# Patient Record
Sex: MALE | Race: WHITE | URBAN - METROPOLITAN AREA
[De-identification: names, ages, dates, MRNs, and addresses within clinical notes are randomized per-mention and may not be internally consistent; named-entity substitution may affect disease eponyms.]

---

## 2021-02-17 ENCOUNTER — DOCTOR'S OFFICE (OUTPATIENT)
Dept: URBAN - METROPOLITAN AREA CLINIC 163 | Facility: CLINIC | Age: 81
Setting detail: OPHTHALMOLOGY
End: 2021-02-17
Payer: MEDICARE

## 2021-02-17 PROCEDURE — 99024 POSTOP FOLLOW-UP VISIT: CPT | Performed by: OPTOMETRIST

## 2021-03-02 ENCOUNTER — DOCTOR'S OFFICE (OUTPATIENT)
Dept: URBAN - METROPOLITAN AREA CLINIC 163 | Facility: CLINIC | Age: 81
Setting detail: OPHTHALMOLOGY
End: 2021-03-02
Payer: MEDICARE

## 2021-03-02 PROCEDURE — 99024 POSTOP FOLLOW-UP VISIT: CPT | Performed by: OPTOMETRIST

## 2021-03-10 ENCOUNTER — DOCTOR'S OFFICE (OUTPATIENT)
Dept: URBAN - METROPOLITAN AREA CLINIC 163 | Facility: CLINIC | Age: 81
Setting detail: OPHTHALMOLOGY
End: 2021-03-10
Payer: MEDICARE

## 2021-03-10 PROCEDURE — 67840 REMOVE EYELID LESION: CPT | Performed by: OPHTHALMOLOGY

## 2021-03-30 ENCOUNTER — DOCTOR'S OFFICE (OUTPATIENT)
Dept: URBAN - METROPOLITAN AREA CLINIC 163 | Facility: CLINIC | Age: 81
Setting detail: OPHTHALMOLOGY
End: 2021-03-30
Payer: MEDICARE

## 2021-03-30 PROCEDURE — 99024 POSTOP FOLLOW-UP VISIT: CPT | Performed by: OPTOMETRIST

## 2021-05-26 ENCOUNTER — RX ONLY (RX ONLY)
Age: 81
End: 2021-05-26

## 2021-05-27 ENCOUNTER — DOCTOR'S OFFICE (OUTPATIENT)
Dept: URBAN - METROPOLITAN AREA CLINIC 163 | Facility: CLINIC | Age: 81
Setting detail: OPHTHALMOLOGY
End: 2021-05-27
Payer: MEDICARE

## 2021-05-27 PROCEDURE — 92012 INTRM OPH EXAM EST PATIENT: CPT | Performed by: OPTOMETRIST

## 2021-05-27 ASSESSMENT — REFRACTION_MANIFEST
OS_CYLINDER: +1.75
OS_AXIS: 175
OD_ADD: +3.00
OD_SPHERE: +0.75
OD_VA1: 20/20
OD_AXIS: 165
OS_SPHERE: -0.75
OS_ADD: +3.00
OD_CYLINDER: +1.00
OS_VA1: 20/20

## 2021-05-27 ASSESSMENT — REFRACTION_CURRENTRX
OD_CYLINDER: +1.00
OS_CYLINDER: +1.25
OS_OVR_VA: 20/
OD_SPHERE: +0.75
OS_OVR_VA: 20/
OD_SPHERE: +0.75
OS_SPHERE: -0.75
OD_OVR_VA: 20/
OS_AXIS: 175
OD_AXIS: 170
OS_AXIS: 5
OS_ADD: +2.50
OD_ADD: +2.50
OD_OVR_VA: 20/
OD_CYLINDER: +1.00
OD_AXIS: 175
OS_CYLINDER: +1.25
OS_SPHERE: +0.25

## 2021-05-27 ASSESSMENT — VISUAL ACUITY
OS_BCVA: 20/20-2
OD_BCVA: 20/25

## 2021-05-27 ASSESSMENT — CONFRONTATIONAL VISUAL FIELD TEST (CVF)
OD_FINDINGS: FULL
OS_FINDINGS: FULL

## 2021-05-27 ASSESSMENT — SPHEQUIV_DERIVED
OD_SPHEQUIV: 1.25
OS_SPHEQUIV: 0.125

## 2021-09-23 ENCOUNTER — DOCTOR'S OFFICE (OUTPATIENT)
Dept: URBAN - METROPOLITAN AREA CLINIC 163 | Facility: CLINIC | Age: 81
Setting detail: OPHTHALMOLOGY
End: 2021-09-23
Payer: MEDICARE

## 2021-09-23 PROCEDURE — 92014 COMPRE OPH EXAM EST PT 1/>: CPT | Performed by: OPTOMETRIST

## 2021-09-23 PROCEDURE — 92250 FUNDUS PHOTOGRAPHY W/I&R: CPT | Performed by: OPTOMETRIST

## 2021-09-23 ASSESSMENT — REFRACTION_CURRENTRX
OS_OVR_VA: 20/
OS_OVR_VA: 20/
OD_OVR_VA: 20/
OD_OVR_VA: 20/

## 2021-09-23 ASSESSMENT — VISUAL ACUITY
OD_BCVA: 20/25
OS_BCVA: 20/20

## 2021-09-23 ASSESSMENT — CONFRONTATIONAL VISUAL FIELD TEST (CVF)
OD_FINDINGS: FULL
OS_FINDINGS: FULL

## 2021-09-23 ASSESSMENT — TONOMETRY
OD_IOP_MMHG: 13
OS_IOP_MMHG: 13

## 2022-03-28 ENCOUNTER — DOCTOR'S OFFICE (OUTPATIENT)
Dept: URBAN - METROPOLITAN AREA CLINIC 163 | Facility: CLINIC | Age: 82
Setting detail: OPHTHALMOLOGY
End: 2022-03-28
Payer: MEDICARE

## 2022-03-28 PROCEDURE — 92083 EXTENDED VISUAL FIELD XM: CPT | Performed by: OPTOMETRIST

## 2022-03-28 PROCEDURE — 92133 CPTRZD OPH DX IMG PST SGM ON: CPT | Performed by: OPTOMETRIST

## 2022-03-28 PROCEDURE — 92012 INTRM OPH EXAM EST PATIENT: CPT | Performed by: OPTOMETRIST

## 2022-03-28 ASSESSMENT — VISUAL ACUITY
OS_BCVA: 20/20-2
OD_BCVA: 20/20

## 2022-03-28 ASSESSMENT — TONOMETRY
OS_IOP_MMHG: 10
OD_IOP_MMHG: 13

## 2022-07-18 ENCOUNTER — DOCTOR'S OFFICE (OUTPATIENT)
Dept: URBAN - METROPOLITAN AREA CLINIC 163 | Facility: CLINIC | Age: 82
Setting detail: OPHTHALMOLOGY
End: 2022-07-18
Payer: MEDICARE

## 2022-07-18 PROBLEM — H04.123: Status: ACTIVE | Noted: 2022-03-28

## 2022-07-18 PROBLEM — D23.121 OTHER BENIGN NEOPLASM OF SKIN OF LEFT UPPER EYELID, INCLUDING CANTHUS: Status: ACTIVE | Noted: 2021-09-23

## 2022-07-18 PROBLEM — H40.1122 POAG; RIGHT EYE MILD, LEFT EYE MODERATE: Status: ACTIVE | Noted: 2022-03-28

## 2022-07-18 PROBLEM — H25.11 CATARACT; RIGHT EYE: Status: ACTIVE | Noted: 2021-05-27

## 2022-07-18 PROBLEM — H40.1111 POAG; RIGHT EYE MILD, LEFT EYE MODERATE: Status: ACTIVE | Noted: 2022-03-28

## 2022-07-18 PROCEDURE — 92145 CORNEAL HYSTERESIS DETER: CPT | Performed by: OPTOMETRIST

## 2022-07-18 PROCEDURE — 92014 COMPRE OPH EXAM EST PT 1/>: CPT | Performed by: OPTOMETRIST

## 2022-07-18 PROCEDURE — 92250 FUNDUS PHOTOGRAPHY W/I&R: CPT | Performed by: OPTOMETRIST

## 2022-07-18 ASSESSMENT — PACHYMETRY
OS_CT_CORRECTION: 0
OD_CT_UM: 549
OS_CT_UM: 546
OD_CT_CORRECTION: 0

## 2022-07-18 ASSESSMENT — CONFRONTATIONAL VISUAL FIELD TEST (CVF)
OS_FINDINGS: FULL
OD_FINDINGS: FULL

## 2022-07-18 ASSESSMENT — VISUAL ACUITY
OD_BCVA: 20/20-
OS_BCVA: 20/20

## 2023-01-16 ENCOUNTER — DOCTOR'S OFFICE (OUTPATIENT)
Dept: URBAN - METROPOLITAN AREA CLINIC 163 | Facility: CLINIC | Age: 83
Setting detail: OPHTHALMOLOGY
End: 2023-01-16
Payer: MEDICARE

## 2023-01-16 DIAGNOSIS — H40.1122: ICD-10-CM

## 2023-01-16 DIAGNOSIS — H25.11: ICD-10-CM

## 2023-01-16 DIAGNOSIS — H40.1111: ICD-10-CM

## 2023-01-16 DIAGNOSIS — D23.121: ICD-10-CM

## 2023-01-16 DIAGNOSIS — H04.123: ICD-10-CM

## 2023-01-16 PROCEDURE — 92133 CPTRZD OPH DX IMG PST SGM ON: CPT | Performed by: OPTOMETRIST

## 2023-01-16 PROCEDURE — 92012 INTRM OPH EXAM EST PATIENT: CPT | Performed by: OPTOMETRIST

## 2023-01-16 PROCEDURE — 92083 EXTENDED VISUAL FIELD XM: CPT | Performed by: OPTOMETRIST

## 2023-01-16 ASSESSMENT — VISUAL ACUITY
OD_BCVA: 20/30
OS_BCVA: 20/20-1

## 2023-01-16 ASSESSMENT — PACHYMETRY
OD_CT_UM: 549
OD_CT_CORRECTION: 0
OS_CT_CORRECTION: 0
OS_CT_UM: 546

## 2023-01-16 ASSESSMENT — CONFRONTATIONAL VISUAL FIELD TEST (CVF)
OS_FINDINGS: FULL
OD_FINDINGS: FULL

## 2023-07-17 ENCOUNTER — DOCTOR'S OFFICE (OUTPATIENT)
Dept: URBAN - METROPOLITAN AREA CLINIC 163 | Facility: CLINIC | Age: 83
Setting detail: OPHTHALMOLOGY
End: 2023-07-17
Payer: MEDICARE

## 2023-07-17 DIAGNOSIS — H40.1122: ICD-10-CM

## 2023-07-17 DIAGNOSIS — H40.1111: ICD-10-CM

## 2023-07-17 DIAGNOSIS — H04.123: ICD-10-CM

## 2023-07-17 DIAGNOSIS — H25.11: ICD-10-CM

## 2023-07-17 DIAGNOSIS — H47.233: ICD-10-CM

## 2023-07-17 DIAGNOSIS — D23.121: ICD-10-CM

## 2023-07-17 PROCEDURE — 92250 FUNDUS PHOTOGRAPHY W/I&R: CPT | Performed by: OPTOMETRIST

## 2023-07-17 PROCEDURE — 0509T PATTERN ERG W/I&R: CPT | Performed by: OPTOMETRIST

## 2023-07-17 PROCEDURE — 92014 COMPRE OPH EXAM EST PT 1/>: CPT | Performed by: OPTOMETRIST

## 2023-07-17 ASSESSMENT — PACHYMETRY
OD_CT_UM: 549
OS_CT_CORRECTION: 0
OS_CT_UM: 546
OD_CT_CORRECTION: 0

## 2023-07-17 ASSESSMENT — CONFRONTATIONAL VISUAL FIELD TEST (CVF)
OS_FINDINGS: FULL
OD_FINDINGS: FULL

## 2023-07-17 ASSESSMENT — VISUAL ACUITY
OS_BCVA: 20/20-1
OD_BCVA: 20/20

## 2023-09-02 PROBLEM — Z97.3 WEARS EYEGLASSES: Status: ACTIVE | Noted: 2023-09-02

## 2023-09-02 PROBLEM — I63.9 CEREBROVASCULAR ACCIDENT (MULTI): Status: ACTIVE | Noted: 2023-09-02

## 2023-09-02 PROBLEM — E11.311 DIABETIC MACULAR EDEMA (MULTI): Status: ACTIVE | Noted: 2023-09-02

## 2023-09-02 PROBLEM — H52.203 ASTIGMATISM OF BOTH EYES: Status: ACTIVE | Noted: 2023-09-02

## 2023-09-02 PROBLEM — E78.00 HYPERCHOLESTEROLEMIA: Status: ACTIVE | Noted: 2023-09-02

## 2023-09-02 PROBLEM — R07.9 CHEST PAIN: Status: ACTIVE | Noted: 2023-09-02

## 2023-09-02 PROBLEM — I25.10 ASHD (ARTERIOSCLEROTIC HEART DISEASE): Status: ACTIVE | Noted: 2023-09-02

## 2023-09-02 PROBLEM — H25.813 COMBINED FORMS OF AGE-RELATED CATARACT OF BOTH EYES: Status: ACTIVE | Noted: 2023-09-02

## 2023-09-02 PROBLEM — E11.65 HYPERGLYCEMIA DUE TO TYPE 2 DIABETES MELLITUS (MULTI): Status: ACTIVE | Noted: 2023-09-02

## 2023-09-02 PROBLEM — I10 ESSENTIAL (PRIMARY) HYPERTENSION: Status: ACTIVE | Noted: 2023-09-02

## 2023-09-02 PROBLEM — M54.9 BACKACHE: Status: ACTIVE | Noted: 2023-09-02

## 2023-09-02 PROBLEM — Z98.42 CATARACT EXTRACTION STATUS OF LEFT EYE: Status: ACTIVE | Noted: 2023-09-02

## 2023-09-02 PROBLEM — M16.9 OSTEOARTHRITIS OF HIP: Status: ACTIVE | Noted: 2023-09-02

## 2023-09-02 PROBLEM — E11.3393: Status: ACTIVE | Noted: 2023-09-02

## 2023-09-02 PROBLEM — Z86.39 HX OF INSULIN DEPENDENT DIABETES MELLITUS: Status: ACTIVE | Noted: 2023-09-02

## 2023-09-02 PROBLEM — E11.9 TYPE 2 DIABETES MELLITUS WITHOUT COMPLICATIONS (MULTI): Status: ACTIVE | Noted: 2023-09-02

## 2023-09-02 PROBLEM — H01.009 BLEPHARITIS: Status: ACTIVE | Noted: 2023-09-02

## 2023-09-02 PROBLEM — Z96.1 PSEUDOPHAKIA, BOTH EYES: Status: ACTIVE | Noted: 2023-09-02

## 2023-09-02 PROBLEM — I48.91 ATRIAL FIBRILLATION (MULTI): Status: ACTIVE | Noted: 2023-09-02

## 2023-09-02 PROBLEM — M48.02 SPINAL STENOSIS IN CERVICAL REGION: Status: ACTIVE | Noted: 2023-09-02

## 2023-09-02 PROBLEM — E10.3393: Status: ACTIVE | Noted: 2023-09-02

## 2023-09-02 PROBLEM — M19.019 PRIMARY LOCALIZED OSTEOARTHROSIS OF SHOULDER REGION: Status: ACTIVE | Noted: 2023-09-02

## 2023-09-02 RX ORDER — FINASTERIDE 5 MG/1
5 TABLET, FILM COATED ORAL DAILY
COMMUNITY

## 2023-09-02 RX ORDER — BLOOD SUGAR DIAGNOSTIC
STRIP MISCELLANEOUS
COMMUNITY
Start: 2018-04-04

## 2023-09-02 RX ORDER — SIMVASTATIN 10 MG/1
TABLET, FILM COATED ORAL
COMMUNITY
Start: 2018-03-21 | End: 2023-12-11 | Stop reason: WASHOUT

## 2023-09-02 RX ORDER — INSULIN GLARGINE 100 [IU]/ML
35 INJECTION, SOLUTION SUBCUTANEOUS EVERY MORNING
COMMUNITY
Start: 2022-01-19 | End: 2023-11-22

## 2023-09-02 RX ORDER — GABAPENTIN 100 MG/1
100 CAPSULE ORAL 2 TIMES DAILY
COMMUNITY

## 2023-09-02 RX ORDER — POLYETHYLENE GLYCOL 3350 17 G/17G
POWDER, FOR SOLUTION ORAL
COMMUNITY

## 2023-09-02 RX ORDER — CARVEDILOL 25 MG/1
1 TABLET ORAL
COMMUNITY
Start: 2021-03-04 | End: 2024-06-20

## 2023-09-02 RX ORDER — ATORVASTATIN CALCIUM 10 MG/1
10 TABLET, FILM COATED ORAL DAILY
COMMUNITY

## 2023-09-02 RX ORDER — NAPROXEN SODIUM 220 MG/1
81 TABLET, FILM COATED ORAL DAILY
COMMUNITY

## 2023-09-02 RX ORDER — AMLODIPINE BESYLATE 5 MG/1
5 TABLET ORAL DAILY
COMMUNITY
Start: 2021-03-19

## 2023-09-02 RX ORDER — ISOSORBIDE MONONITRATE 30 MG/1
30 TABLET, EXTENDED RELEASE ORAL 2 TIMES DAILY
COMMUNITY
Start: 2023-06-05

## 2023-09-02 RX ORDER — UBIDECARENONE 75 MG
500 CAPSULE ORAL DAILY
COMMUNITY

## 2023-09-02 RX ORDER — DEXTROMETHORPHAN HYDROBROMIDE, GUAIFENESIN 5; 100 MG/5ML; MG/5ML
1300 LIQUID ORAL EVERY 4 HOURS PRN
COMMUNITY

## 2023-09-02 RX ORDER — DOFETILIDE 0.25 MG/1
250 CAPSULE ORAL 2 TIMES DAILY
COMMUNITY
Start: 2023-07-05

## 2023-09-02 RX ORDER — INSULIN ASPART 100 [IU]/ML
INJECTION, SOLUTION INTRAVENOUS; SUBCUTANEOUS
COMMUNITY
Start: 2018-06-11 | End: 2023-11-22

## 2023-09-02 RX ORDER — INSULIN LISPRO 100 [IU]/ML
INJECTION, SOLUTION INTRAVENOUS; SUBCUTANEOUS
COMMUNITY
End: 2023-12-11 | Stop reason: WASHOUT

## 2023-09-02 RX ORDER — DOCUSATE SODIUM 100 MG/1
100 CAPSULE, LIQUID FILLED ORAL 2 TIMES DAILY
COMMUNITY

## 2023-09-02 RX ORDER — PEN NEEDLE, DIABETIC 30 GX3/16"
NEEDLE, DISPOSABLE MISCELLANEOUS
COMMUNITY
Start: 2020-12-28 | End: 2023-12-18 | Stop reason: SDUPTHER

## 2023-09-02 RX ORDER — GLUCOSAMINE/D3/BOSWELLIA SERRA 1500MG-400
2 TABLET ORAL DAILY
COMMUNITY

## 2023-09-02 RX ORDER — INSULIN DETEMIR 100 [IU]/ML
35 INJECTION, SOLUTION SUBCUTANEOUS EVERY MORNING
COMMUNITY
Start: 2018-01-19 | End: 2023-12-11 | Stop reason: WASHOUT

## 2023-09-02 RX ORDER — WARFARIN 3 MG/1
TABLET ORAL
COMMUNITY
End: 2023-12-11 | Stop reason: WASHOUT

## 2023-09-02 RX ORDER — ASCORBIC ACID 500 MG
500 TABLET ORAL DAILY
COMMUNITY
End: 2023-12-11 | Stop reason: WASHOUT

## 2023-09-02 RX ORDER — CARVEDILOL 6.25 MG/1
1 TABLET ORAL
COMMUNITY
End: 2023-12-11 | Stop reason: WASHOUT

## 2023-09-02 RX ORDER — AMOXICILLIN 250 MG
1 CAPSULE ORAL 2 TIMES DAILY
COMMUNITY
Start: 2023-05-30

## 2023-09-02 RX ORDER — LOSARTAN POTASSIUM 50 MG/1
50 TABLET ORAL DAILY
COMMUNITY

## 2023-09-02 RX ORDER — CHOLECALCIFEROL (VITAMIN D3) 125 MCG
CAPSULE ORAL
COMMUNITY

## 2023-11-22 DIAGNOSIS — E11.9 TYPE 2 DIABETES MELLITUS WITHOUT COMPLICATION, WITH LONG-TERM CURRENT USE OF INSULIN (MULTI): Primary | ICD-10-CM

## 2023-11-22 DIAGNOSIS — Z79.4 TYPE 2 DIABETES MELLITUS WITHOUT COMPLICATION, WITH LONG-TERM CURRENT USE OF INSULIN (MULTI): Primary | ICD-10-CM

## 2023-11-22 RX ORDER — INSULIN GLARGINE 100 [IU]/ML
INJECTION, SOLUTION SUBCUTANEOUS
Qty: 90 ML | Refills: 3 | Status: SHIPPED | OUTPATIENT
Start: 2023-11-22

## 2023-11-22 RX ORDER — INSULIN ASPART 100 [IU]/ML
INJECTION, SOLUTION INTRAVENOUS; SUBCUTANEOUS
Qty: 45 ML | Refills: 3 | Status: SHIPPED | OUTPATIENT
Start: 2023-11-22

## 2023-12-07 NOTE — PROGRESS NOTES
HPI:  82 yo with Diabetes 2(dx mid 1990's,) HTN, Dyslipidemia, CVD (MI/cabg) irregular heart rhythm, CVA (L sided numbness), renal cancer, short term memory loss here for f/u. A1c was 6.9 %, today 7.4 %.  Pt is struggling following a carb controlled diet and knows reasonable carb allowances. Pt is able to afford their medications.         Personal start for margareth 2 CGM provided last visit, enjoying use, denies problems with sensor use/adhesion         30 day margareth data (date corrected) in range 64%, 0% lows, avg , pattern: mid 100's on waking, often  <100 , mid/100's at lunch, upper 100's-low 200's at dinner, 150-200 at bedtime.           Pt taking basaglar 35-0-0-40           taking novolog scale 100-120 9 units, 121-160 10units, 161-200 11 units, etc, forgets some doses         Pt not interested in carb counting.               Taking atorvastatin 10mg for lipids and tolerating.         Taking losartan 50mg, amlodipine 5mg, carvedilol 25mg bid, isordil 30mg, tikosyn for htn/heart.          -watchman placement planned         Recent labs with [facility], not available for review    Reviewed dosing novolog when dining out, insulin storage, reminders for novolog dosing, treatment of hypoglycemia    /64 (BP Location: Right arm, Patient Position: Sitting)   Pulse 61   Wt 96 kg (211 lb 9.6 oz)   BMI 33.14 kg/m²     Current Outpatient Medications:     acetaminophen (Tylenol Arthritis Pain) 650 mg ER tablet, Take 2 tablets (1,300 mg) by mouth every 4 hours if needed., Disp: , Rfl:     amLODIPine (Norvasc) 5 mg tablet, Take 1 tablet (5 mg) by mouth once daily., Disp: , Rfl:     aspirin 81 mg chewable tablet, Chew 1 tablet (81 mg) once daily., Disp: , Rfl:     atorvastatin (Lipitor) 10 mg tablet, Take 1 tablet (10 mg) by mouth once daily., Disp: , Rfl:     carvedilol (Coreg) 25 mg tablet, Take 1 tablet (25 mg) by mouth 2 times a day with meals., Disp: , Rfl:     cholecalciferol (Vitamin D-3) 125 MCG  "(5000 UT) capsule, as directed Orally, Disp: , Rfl:     cyanocobalamin (Vitamin B-12) 500 mcg tablet, Take 1 tablet (500 mcg) by mouth once daily., Disp: , Rfl:     docusate sodium (Colace) 100 mg capsule, Take 1 capsule (100 mg) by mouth 2 times a day., Disp: , Rfl:     dofetilide (Tikosyn) 250 mcg capsule, Take 1 capsule (250 mcg) by mouth twice a day., Disp: , Rfl:     finasteride (Proscar) 5 mg tablet, Take 1 tablet (5 mg) by mouth once daily., Disp: , Rfl:     gabapentin (Neurontin) 100 mg capsule, Take 1 capsule (100 mg) by mouth 2 times a day., Disp: , Rfl:     glucosamine-D3-Boswellia serr (Glucosamine Daily Complex) 1,500-400-100 mg-unit-mg tablet, Take 2 tablets by mouth once daily., Disp: , Rfl:     insulin aspart (NovoLOG Flexpen U-100 Insulin) 100 unit/mL (3 mL) pen, INJECT WITH MEALS PER SLIDING SCALE A TOTAL OF 45 UNITS DAILY, Disp: 45 mL, Rfl: 3    insulin glargine (Basaglar KwikPen U-100 Insulin) 100 unit/mL (3 mL) pen, INJECT 35 UNITS UNDER THE SKIN IN THE MORNING AND 50 UNITS EVERY EVENING, Disp: 90 mL, Rfl: 3    isosorbide mononitrate ER (Imdur) 30 mg 24 hr tablet, Take 1 tablet (30 mg) by mouth twice a day., Disp: , Rfl:     losartan (Cozaar) 50 mg tablet, Take 1 tablet (50 mg) by mouth once daily., Disp: , Rfl:     OneTouch Ultra Test strip, OneTouch Ultra Blue STRP  Quantity: 300  Refills: 0      Start : 4-Apr-2018  Active, Disp: , Rfl:     pen needle, diabetic 32 gauge x 5/32\" needle, as directed sc 5 times per day for 90 days, Disp: , Rfl:     polyethylene glycol (Miralax) 17 gram/dose powder, MiraLax POWD  Refills: 0     Active, Disp: , Rfl:     sennosides-docusate sodium (Radha-Colace) 8.6-50 mg tablet, Take 1 tablet by mouth twice a day., Disp: , Rfl:   Labs:  Lab Results   Component Value Date    WBC 7.1 06/10/2022    NRBC 0 06/10/2022    RBC 4.38 (L) 06/10/2022    HGB 12.6 (L) 06/10/2022    HCT 38.8 (L) 06/10/2022     06/10/2022     Lab Results   Component Value Date    CALCIUM " 10.0 06/10/2022    AST 18 06/10/2022    ALKPHOS 63 06/10/2022    BILITOT 0.3 06/10/2022    PROT 6.7 06/10/2022    ALBUMIN 4.1 06/10/2022    GLOB 2.6 06/10/2022    AGR 1.6 06/10/2022     06/10/2022    K 4.4 06/10/2022     06/10/2022    CO2 26 06/10/2022    ANIONGAP 9 06/10/2022    BUN 18 06/10/2022    CREATININE 0.9 06/10/2022    UREACREAUR 20.0 06/10/2022    GLUCOSE 71 06/10/2022    ALT 18 06/10/2022    EGFR 84 06/10/2022     Lab Results   Component Value Date    CHOL 109 (L) 06/10/2022    TRIG 146 06/10/2022    HDL 30 (L) 06/10/2022    LDLCALC 50 (L) 06/10/2022     Lab Results   Component Value Date    MICROALBCREA 31.0 (H) 06/10/2022     Lab Results   Component Value Date    TSH 2.06 07/11/2019     Lab Results   Component Value Date    [id number] 930 07/05/2022     Lab Results   Component Value Date    HGBA1C 7.4 (A) 12/11/2023       Assessment and Plan:    1. Type 2 diabetes mellitus with hyperglycemia, with long-term current use of insulin (CMS/[facility])    -dropping overnight on current lantus dose, decrease pm to 35 units    -postprandial hyperglycemia dinner, add 4 units to dinner scale    2. Essential (primary) hypertension    -at target on current therapy    3. Hypercholesterolemia    -on statin and tolerating        -labs/tests/notes reviewed  -reviewed and counseled patient on medication monitoring and side effects    Treatment and plan discussed with Dr. Jiménez. Leatha RN Certified [facility] and

## 2023-12-11 ENCOUNTER — CLINICAL SUPPORT (OUTPATIENT)
Dept: ENDOCRINOLOGY | Facility: CLINIC | Age: 83
End: 2023-12-11
Payer: MEDICARE

## 2023-12-11 VITALS
WEIGHT: 211.6 LBS | BODY MASS INDEX: 33.14 KG/M2 | DIASTOLIC BLOOD PRESSURE: 64 MMHG | HEART RATE: 61 BPM | SYSTOLIC BLOOD PRESSURE: 140 MMHG

## 2023-12-11 DIAGNOSIS — I10 ESSENTIAL (PRIMARY) HYPERTENSION: ICD-10-CM

## 2023-12-11 DIAGNOSIS — E11.65 TYPE 2 DIABETES MELLITUS WITH HYPERGLYCEMIA, WITH LONG-TERM CURRENT USE OF INSULIN (MULTI): Primary | ICD-10-CM

## 2023-12-11 DIAGNOSIS — Z79.4 TYPE 2 DIABETES MELLITUS WITH HYPERGLYCEMIA, WITH LONG-TERM CURRENT USE OF INSULIN (MULTI): Primary | ICD-10-CM

## 2023-12-11 DIAGNOSIS — E78.00 HYPERCHOLESTEROLEMIA: ICD-10-CM

## 2023-12-11 LAB — POC HEMOGLOBIN A1C: 7.4 % (ref 4.2–6.5)

## 2023-12-11 PROCEDURE — 95251 CONT GLUC MNTR ANALYSIS I&R: CPT | Performed by: INTERNAL MEDICINE

## 2023-12-11 PROCEDURE — 99214 OFFICE O/P EST MOD 30 MIN: CPT | Performed by: INTERNAL MEDICINE

## 2023-12-11 PROCEDURE — 83036 HEMOGLOBIN GLYCOSYLATED A1C: CPT | Performed by: INTERNAL MEDICINE

## 2023-12-11 PROCEDURE — 95249 CONT GLUC MNTR PT PROV EQP: CPT | Performed by: INTERNAL MEDICINE

## 2023-12-11 ASSESSMENT — PAIN SCALES - GENERAL: PAINLEVEL: 0-NO PAIN

## 2023-12-11 NOTE — PATIENT INSTRUCTIONS
Decrease evening Lantus to 35 units    Add 4 units novolog to your dinner   Base dose is now 14 units (not 10)

## 2023-12-11 NOTE — PROGRESS NOTES
I, Dr Nicolas Jiménez, have reviewed this progress note, medication list, vital signs, any pertinent lab values, and any CGM data if present with the Certified Diabetes [facility] and  face to face during this visit today. This note reflects the treatment plan that was made under my direction after reviewing the above mentioned elements while face to face with the patient and CDE.  I personally answered and addressed any questions and concerns the patient had during the visit today.  The CDE entered the data in this note under my direction and I personally reviewed it, signed any lab or medication orders that I instructed to be completed. I am the billing provider for this visit and the level of service was determined by my involvement in the Medical Decision Making Component of this visit while face to face with the patient.    Nicolas Jiménez MD

## 2023-12-18 DIAGNOSIS — E11.9 TYPE 2 DIABETES MELLITUS WITHOUT COMPLICATION, UNSPECIFIED WHETHER LONG TERM INSULIN USE (MULTI): Primary | ICD-10-CM

## 2023-12-18 RX ORDER — PEN NEEDLE, DIABETIC 30 GX3/16"
1 NEEDLE, DISPOSABLE MISCELLANEOUS
Qty: 500 EACH | Refills: 1 | Status: SHIPPED | OUTPATIENT
Start: 2023-12-18 | End: 2023-12-19 | Stop reason: SDUPTHER

## 2023-12-19 DIAGNOSIS — E11.9 TYPE 2 DIABETES MELLITUS WITHOUT COMPLICATION, UNSPECIFIED WHETHER LONG TERM INSULIN USE (MULTI): ICD-10-CM

## 2023-12-19 RX ORDER — PEN NEEDLE, DIABETIC 30 GX3/16"
1 NEEDLE, DISPOSABLE MISCELLANEOUS
Qty: 500 EACH | Refills: 1 | Status: SHIPPED | OUTPATIENT
Start: 2023-12-19 | End: 2024-03-18

## 2024-06-11 ENCOUNTER — APPOINTMENT (OUTPATIENT)
Dept: ENDOCRINOLOGY | Facility: CLINIC | Age: 84
End: 2024-06-11
Payer: MEDICARE

## 2024-06-25 ENCOUNTER — OFFICE VISIT (OUTPATIENT)
Dept: ENDOCRINOLOGY | Facility: CLINIC | Age: 84
End: 2024-06-25
Payer: MEDICARE

## 2024-06-25 VITALS
DIASTOLIC BLOOD PRESSURE: 58 MMHG | HEART RATE: 59 BPM | BODY MASS INDEX: 30.38 KG/M2 | SYSTOLIC BLOOD PRESSURE: 136 MMHG | WEIGHT: 194 LBS

## 2024-06-25 DIAGNOSIS — E11.9 DIABETES MELLITUS WITHOUT COMPLICATION (MULTI): ICD-10-CM

## 2024-06-25 DIAGNOSIS — E78.00 HYPERCHOLESTEROLEMIA: ICD-10-CM

## 2024-06-25 DIAGNOSIS — I10 ESSENTIAL (PRIMARY) HYPERTENSION: Primary | ICD-10-CM

## 2024-06-25 LAB — POC HEMOGLOBIN A1C: 7.1 % (ref 4.2–6.5)

## 2024-06-25 PROCEDURE — 83036 HEMOGLOBIN GLYCOSYLATED A1C: CPT | Performed by: INTERNAL MEDICINE

## 2024-06-25 PROCEDURE — 3078F DIAST BP <80 MM HG: CPT | Performed by: INTERNAL MEDICINE

## 2024-06-25 PROCEDURE — 1159F MED LIST DOCD IN RCRD: CPT | Performed by: INTERNAL MEDICINE

## 2024-06-25 PROCEDURE — 3075F SYST BP GE 130 - 139MM HG: CPT | Performed by: INTERNAL MEDICINE

## 2024-06-25 PROCEDURE — 1126F AMNT PAIN NOTED NONE PRSNT: CPT | Performed by: INTERNAL MEDICINE

## 2024-06-25 PROCEDURE — 1036F TOBACCO NON-USER: CPT | Performed by: INTERNAL MEDICINE

## 2024-06-25 PROCEDURE — 99214 OFFICE O/P EST MOD 30 MIN: CPT | Performed by: INTERNAL MEDICINE

## 2024-06-25 RX ORDER — FLASH GLUCOSE SENSOR
KIT MISCELLANEOUS AS NEEDED
COMMUNITY

## 2024-06-25 RX ORDER — FLASH GLUCOSE SCANNING READER
EACH MISCELLANEOUS AS NEEDED
COMMUNITY

## 2024-06-25 ASSESSMENT — PATIENT HEALTH QUESTIONNAIRE - PHQ9
1. LITTLE INTEREST OR PLEASURE IN DOING THINGS: NOT AT ALL
2. FEELING DOWN, DEPRESSED OR HOPELESS: NOT AT ALL
SUM OF ALL RESPONSES TO PHQ9 QUESTIONS 1 & 2: 0

## 2024-06-25 ASSESSMENT — LIFESTYLE VARIABLES
HOW OFTEN DO YOU HAVE SIX OR MORE DRINKS ON ONE OCCASION: NEVER
AUDIT-C TOTAL SCORE: 0
HOW OFTEN DO YOU HAVE A DRINK CONTAINING ALCOHOL: NEVER
HOW MANY STANDARD DRINKS CONTAINING ALCOHOL DO YOU HAVE ON A TYPICAL DAY: PATIENT DOES NOT DRINK
SKIP TO QUESTIONS 9-10: 1

## 2024-06-25 ASSESSMENT — PAIN SCALES - GENERAL: PAINLEVEL: 0-NO PAIN

## 2024-06-25 ASSESSMENT — ENCOUNTER SYMPTOMS
OCCASIONAL FEELINGS OF UNSTEADINESS: 0
DEPRESSION: 0
LOSS OF SENSATION IN FEET: 0

## 2024-06-25 NOTE — PROGRESS NOTES
HPI   85 yo with Diabetes 2(dx mid 1990's,) HTN, Dyslipidemia, CVD (MI/cabg) irregular heart rhythm, CVA (L sided numbness), renal cancer, short term memory loss here for f/u. A1c was 7.4 %, today 7.1 %.    Pt is struggling following a carb controlled diet and knows reasonable carb allowances. Pt is able to afford their medications. Testing sugars 4 times per day with nimesh 2.                    Pt taking basaglar 35-0-0-35    taking novolog scale 100-120 9 units, 121-160 10units, 161-200 11 units, etc, forgets some doses, Pt not interested in carb counting.    Nimesh 2 data for 7 days: 90% in range, 10% low, pattern: low 100's overnight, waking <100, often <100 through the day, evening low 100's.        Taking atorvastatin 10mg for lipids and tolerating.           Taking losartan 50mg, amlodipine 5mg, carvedilol 25mg bid, isordil 30mg, tikosyn for htn/heart.      -recently hospitalized for pneumonia       Current Outpatient Medications:     acetaminophen (Tylenol Arthritis Pain) 650 mg ER tablet, Take 2 tablets (1,300 mg) by mouth every 4 hours if needed., Disp: , Rfl:     aspirin 81 mg chewable tablet, Chew 1 tablet (81 mg) once daily., Disp: , Rfl:     atorvastatin (Lipitor) 10 mg tablet, Take 1 tablet (10 mg) by mouth once daily., Disp: , Rfl:     cholecalciferol (Vitamin D-3) 125 MCG (5000 UT) capsule, as directed Orally, Disp: , Rfl:     cyanocobalamin (Vitamin B-12) 500 mcg tablet, Take 1 tablet (500 mcg) by mouth once daily., Disp: , Rfl:     docusate sodium (Colace) 100 mg capsule, Take 1 capsule (100 mg) by mouth 2 times a day., Disp: , Rfl:     dofetilide (Tikosyn) 250 mcg capsule, Take 1 capsule (250 mcg) by mouth twice a day., Disp: , Rfl:     finasteride (Proscar) 5 mg tablet, Take 1 tablet (5 mg) by mouth once daily., Disp: , Rfl:     FreeStyle Nimesh 2 [place] misc, Inject under the skin if needed. Use as instructed, Disp: , Rfl:     FreeStyle Nimesh 2 Sensor kit, Inject under the skin if needed. Use as  instructed, Disp: , Rfl:     gabapentin (Neurontin) 100 mg capsule, Take 1 capsule (100 mg) by mouth 2 times a day., Disp: , Rfl:     glucosamine-D3-Boswellia serr (Glucosamine Daily Complex) 1,500-400-100 mg-unit-mg tablet, Take 2 tablets by mouth once daily., Disp: , Rfl:     insulin aspart (NovoLOG Flexpen U-100 Insulin) 100 unit/mL (3 mL) pen, INJECT WITH MEALS PER SLIDING SCALE A TOTAL OF 45 UNITS DAILY, Disp: 45 mL, Rfl: 3    insulin glargine (Basaglar KwikPen U-100 Insulin) 100 unit/mL (3 mL) pen, INJECT 35 UNITS UNDER THE SKIN IN THE MORNING AND 50 UNITS EVERY EVENING, Disp: 90 mL, Rfl: 3    isosorbide mononitrate ER (Imdur) 30 mg 24 hr tablet, Take 1 tablet (30 mg) by mouth twice a day., Disp: , Rfl:     losartan (Cozaar) 50 mg tablet, Take 1 tablet (50 mg) by mouth once daily., Disp: , Rfl:     OneTouch Ultra Test strip, OneTouch Ultra Blue STRP  Quantity: 300  Refills: 0      Start : 4-Apr-2018  Active, Disp: , Rfl:     polyethylene glycol (Miralax) 17 gram/dose powder, MiraLax POWD  Refills: 0     Active, Disp: , Rfl:     sennosides-docusate sodium (Radha-Colace) 8.6-50 mg tablet, Take 1 tablet by mouth twice a day., Disp: , Rfl:     amLODIPine (Norvasc) 5 mg tablet, Take 1 tablet (5 mg) by mouth once daily., Disp: , Rfl:     carvedilol (Coreg) 25 mg tablet, Take 1 tablet (25 mg) by mouth 2 times a day with meals., Disp: , Rfl:       Allergies as of 06/25/2024    (No Known Allergies)         Review of Systems   Cardiology: Lightheadedness-denies.  Chest pain-denies.  Leg edema-denies.  Palpitations-denies.  Respiratory: Cough +. Shortness of breath +.  Wheezing-denies.  Gastroenterology: Constipation-denies.  Diarrhea-denies.  Heartburn-denies.  Endocrinology: Cold intolerance-denies.  Heat intolerance-denies.  Sweats-denies.  Neurology: Headache-denies.  Tremor-denies.  Neuropathy in extremities-denies.  Psychology: Low energy +.  Irritability-denies.  Sleep disturbances-denies.      /58   Pulse  59   Wt 88 kg (194 lb)   BMI 30.38 kg/m²       Labs:  Lab Results   Component Value Date    WBC 7.1 06/10/2022    NRBC 0 06/10/2022    RBC 4.38 (L) 06/10/2022    HGB 12.6 (L) 06/10/2022    HCT 38.8 (L) 06/10/2022     06/10/2022     Lab Results   Component Value Date    CALCIUM 10.0 06/10/2022    AST 18 06/10/2022    ALKPHOS 63 06/10/2022    BILITOT 0.3 06/10/2022    PROT 6.7 06/10/2022    ALBUMIN 4.1 06/10/2022    GLOB 2.6 06/10/2022    AGR 1.6 06/10/2022     06/10/2022    K 4.4 06/10/2022     06/10/2022    CO2 26 06/10/2022    ANIONGAP 9 06/10/2022    BUN 18 06/10/2022    CREATININE 0.9 06/10/2022    UREACREAUR 20.0 06/10/2022    GLUCOSE 71 06/10/2022    ALT 18 06/10/2022    EGFR 84 06/10/2022     Lab Results   Component Value Date    CHOL 109 (L) 06/10/2022    TRIG 146 06/10/2022    HDL 30 (L) 06/10/2022    LDLCALC 50 (L) 06/10/2022     Lab Results   Component Value Date    MICROALBCREA 31.0 (H) 06/10/2022     Lab Results   Component Value Date    TSH 2.06 07/11/2019     Lab Results   Component Value Date    [id number] 930 07/05/2022     Lab Results   Component Value Date    HGBA1C 7.1 (A) 06/25/2024         Physical Exam   General Appearance: pleasant, cooperative, no acute distress  HEENT: no chemosis, no proptosis, no lid lag, no lid retraction  Neck: no lymphadenopathy, no thyromegaly, no dominant thyroid nodules  Heart: no murmurs, regular rate and rhythm, S1 and S2  Lungs: no wheezes, no rhonci, no rales  Extremities: no lower extremity swelling      Assessment/Plan   1. Diabetes mellitus without complication (Multi)  -A1c ordered and reviewed  -margareth 2 data reviewed  -labs reviewed    -too many lows, not eating as much  -lower basaglar from 35 units bid to 25 units bid  -continue novolog scale at meals only, don't add extra at dinner    -if sugars <100 call office to futher lower basal, call if >200 if you start eating more    2. Essential (primary) hypertension  -at target on meds,  will follow    3. Hypercholesterolemia  -on statin and tolerating         Follow Up:  Avinash Chandler 3 months    Medical Decision Making  Complexity of problem: Chronic illness of diabetes mellitus uncontrolled, progressing  Data analyzed and reviewed: Reviewed prior notes, blood glucose data, labs including HgbA1c, lipids, serum chemistries.  Ordered tests.   Risk of complications and morbidities: Is definite because of use of insulin and risk of hypoglycemia.  Prescription medications reviewed and modifications made.  Compliance assessed.  Addressed social determinants of health including food insecurity.

## 2024-06-28 ENCOUNTER — TELEPHONE (OUTPATIENT)
Dept: ENDOCRINOLOGY | Facility: CLINIC | Age: 84
End: 2024-06-28
Payer: MEDICARE

## 2024-06-28 NOTE — TELEPHONE ENCOUNTER
"[facility] calling concerned patient may not be taking his Humalog correctly.      She is needing to confirm \"What is the sliding scale for patient's Humalog.\"      He is telling his home nurse that he is supposed to do the followin-100 =8  101-120=9  121-140=10  141-160=10  161-180=11  181-200=11  201-220=12  221-240=12  241-260=13  261-280=14  281-300=15    Is this correct?   Akila   "

## 2024-09-25 ENCOUNTER — APPOINTMENT (OUTPATIENT)
Dept: ENDOCRINOLOGY | Facility: CLINIC | Age: 84
End: 2024-09-25
Payer: MEDICARE

## 2024-09-25 VITALS
DIASTOLIC BLOOD PRESSURE: 57 MMHG | BODY MASS INDEX: 30.7 KG/M2 | SYSTOLIC BLOOD PRESSURE: 132 MMHG | WEIGHT: 196 LBS | HEART RATE: 64 BPM

## 2024-09-25 DIAGNOSIS — E11.65 TYPE 2 DIABETES MELLITUS WITH HYPERGLYCEMIA, WITH LONG-TERM CURRENT USE OF INSULIN: Primary | ICD-10-CM

## 2024-09-25 DIAGNOSIS — Z79.4 TYPE 2 DIABETES MELLITUS WITH HYPERGLYCEMIA, WITH LONG-TERM CURRENT USE OF INSULIN: Primary | ICD-10-CM

## 2024-09-25 DIAGNOSIS — I10 ESSENTIAL (PRIMARY) HYPERTENSION: ICD-10-CM

## 2024-09-25 DIAGNOSIS — E78.00 HYPERCHOLESTEROLEMIA: ICD-10-CM

## 2024-09-25 LAB — POC HEMOGLOBIN A1C: 6.6 % (ref 4.2–6.5)

## 2024-09-25 PROCEDURE — 83036 HEMOGLOBIN GLYCOSYLATED A1C: CPT | Performed by: INTERNAL MEDICINE

## 2024-09-25 PROCEDURE — 99214 OFFICE O/P EST MOD 30 MIN: CPT | Performed by: INTERNAL MEDICINE

## 2024-09-25 PROCEDURE — 95251 CONT GLUC MNTR ANALYSIS I&R: CPT | Performed by: INTERNAL MEDICINE

## 2024-09-25 NOTE — PROGRESS NOTES
Attestation signed by Nicolas Jiménez MD on 9/25/24 at 1:19 PM.    I, Dr Nicolas Jiménez, have reviewed this progress note, medication list, vital signs, any pertinent lab values, and any CGM data if present with the Cert[facility] and  face to face during this visit today. This note reflects the treatment plan that was made under my direction after reviewing the above mentioned elements while face to face with the patient and CDE.  I personally answered and addressed any questions and concerns the patient had during the visit today.  The CDE entered the data in this note under my direction and I personally reviewed it, signed any lab or medication orders that I instructed to be completed. I am the billing provider for this visit and the level of service was determined by my involvement in the Medical Decision Making Component of this visit while face to face with the patient.

## 2024-09-25 NOTE — PROGRESS NOTES
HPI     83 yo with Diabetes 2(dx mid 1990's,) HTN, Dyslipidemia, CVD (MI/cabg) irregular heart rhythm, CVA (L sided numbness), renal cancer, short term memory loss here for f/u. A1c was 7.1%, today 6.6%.    Testing sugars 4 times per day with nimesh 2.  Pt is struggling following a carb controlled diet and knows reasonable carb allowances. Pt is able to afford their medications.                    Pt taking basaglar 25-0-0-25, decr last visit,  taking novolog scale 100-120 9 units, 121-160 10units, 161-200 11 units, etc, forgets some doses, Pt not interested in carb counting.    -Nimesh 2 data for 14 days:  71% in range, 0% (was 10%) low,  pattern: mid to low 100's overnight/waking, mid to low 100's through the day, evening low 100's.        Taking atorvastatin 10mg for lipids and tolerating.          Taking losartan 50mg, amlodipine 5mg, carvedilol 25mg bid, isordil 30mg, tikosyn for htn/heart.      -recently hospitalized for pneumonia        Current Outpatient Medications:     acetaminophen (Tylenol Arthritis Pain) 650 mg ER tablet, Take 2 tablets (1,300 mg) by mouth every 4 hours if needed., Disp: , Rfl:     amLODIPine (Norvasc) 5 mg tablet, Take 1 tablet (5 mg) by mouth once daily., Disp: , Rfl:     aspirin 81 mg chewable tablet, Chew 1 tablet (81 mg) once daily., Disp: , Rfl:     atorvastatin (Lipitor) 10 mg tablet, Take 1 tablet (10 mg) by mouth once daily., Disp: , Rfl:     cholecalciferol (Vitamin D-3) 125 MCG (5000 UT) capsule, as directed Orally, Disp: , Rfl:     cyanocobalamin (Vitamin B-12) 500 mcg tablet, Take 1 tablet (500 mcg) by mouth once daily., Disp: , Rfl:     docusate sodium (Colace) 100 mg capsule, Take 1 capsule (100 mg) by mouth 2 times a day., Disp: , Rfl:     dofetilide (Tikosyn) 250 mcg capsule, Take 1 capsule (250 mcg) by mouth twice a day., Disp: , Rfl:     finasteride (Proscar) 5 mg tablet, Take 1 tablet (5 mg) by mouth once daily., Disp: , Rfl:     FreeStyle Nimesh 2 [place] misc, Inject  under the skin if needed. Use as instructed, Disp: , Rfl:     FreeStyle Nimesh 2 Sensor kit, Inject under the skin if needed. Use as instructed, Disp: , Rfl:     gabapentin (Neurontin) 100 mg capsule, Take 1 capsule (100 mg) by mouth 2 times a day., Disp: , Rfl:     glucosamine-D3-Boswellia serr (Glucosamine Daily Complex) 1,500-400-100 mg-unit-mg tablet, Take 2 tablets by mouth once daily., Disp: , Rfl:     insulin aspart (NovoLOG Flexpen U-100 Insulin) 100 unit/mL (3 mL) pen, INJECT WITH MEALS PER SLIDING SCALE A TOTAL OF 45 UNITS DAILY, Disp: 45 mL, Rfl: 3    insulin glargine (Basaglar KwikPen U-100 Insulin) 100 unit/mL (3 mL) pen, INJECT 35 UNITS UNDER THE SKIN IN THE MORNING AND 50 UNITS EVERY EVENING, Disp: 90 mL, Rfl: 3    isosorbide mononitrate ER (Imdur) 30 mg 24 hr tablet, Take 1 tablet (30 mg) by mouth twice a day., Disp: , Rfl:     losartan (Cozaar) 50 mg tablet, Take 1 tablet (50 mg) by mouth once daily., Disp: , Rfl:     OneTouch Ultra Test strip, OneTouch Ultra Blue STRP  Quantity: 300  Refills: 0      Start : 4-Apr-2018  Active, Disp: , Rfl:     polyethylene glycol (Miralax) 17 gram/dose powder, MiraLax POWD  Refills: 0     Active, Disp: , Rfl:     sennosides-docusate sodium (Radha-Colace) 8.6-50 mg tablet, Take 1 tablet by mouth twice a day., Disp: , Rfl:     carvedilol (Coreg) 25 mg tablet, Take 1 tablet (25 mg) by mouth 2 times a day with meals., Disp: , Rfl:     Allergies as of 09/25/2024    (No Known Allergies)       /57   Pulse 64   Wt 88.9 kg (196 lb)   BMI 30.70 kg/m²     Labs:   Lab Results   Component Value Date    WBC 7.1 06/10/2022    NRBC 0 06/10/2022    RBC 4.38 (L) 06/10/2022    HGB 12.6 (L) 06/10/2022    HCT 38.8 (L) 06/10/2022     06/10/2022     Lab Results   Component Value Date    CALCIUM 10.0 06/10/2022    AST 18 06/10/2022    ALKPHOS 63 06/10/2022    BILITOT 0.3 06/10/2022    PROT 6.7 06/10/2022    ALBUMIN 4.1 06/10/2022    GLOB 2.6 06/10/2022    AGR 1.6 06/10/2022      06/10/2022    K 4.4 06/10/2022     06/10/2022    CO2 26 06/10/2022    ANIONGAP 9 06/10/2022    BUN 18 06/10/2022    CREATININE 0.9 06/10/2022    UREACREAUR 20.0 06/10/2022    GLUCOSE 71 06/10/2022    ALT 18 06/10/2022    EGFR 84 06/10/2022     Lab Results   Component Value Date    CHOL 109 (L) 06/10/2022    TRIG 146 06/10/2022    HDL 30 (L) 06/10/2022    LDLCALC 50 (L) 06/10/2022     Lab Results   Component Value Date    MICROALBCREA 31.0 (H) 06/10/2022     Lab Results   Component Value Date    TSH 2.06 07/11/2019     Lab Results   Component Value Date    [id number] 930 07/05/2022     Lab Results   Component Value Date    HGBA1C 6.6 (A) 09/25/2024         Assessment/Plan   1. Type 2 diabetes mellitus with hyperglycemia, with long-term current use of insulin (Multi)    -A1c ordered and reviewed  -margareth 2 data reviewed  -[facility] July 2024 labs reviewed    -still dropping lower overnight while fasting, admits occ dosing novolog at hs if/when high  -lower basaglar from 25 units bid to 22 units bid  -continue novolog scale at meals only, please do not dose extra at hs     2. Essential (primary) hypertension  -at target on meds, will follow    3. Hypercholesterolemia  -on statin and tolerating        Follow up: 4mon bb    -labs/tests/notes reviewed  -reviewed and counseled patient on medication monitoring and side effects    Treatment and plan discussed with Dr. Jiménez.  JOSE Lacy, PharmD, BC-ADM, CDCES.     Medical Decision Making  Complexity of problem: Chronic illness of diabetes mellitus uncontrolled, progressing  Data analyzed and reviewed: Reviewed prior notes, blood glucose data, labs including HgbA1c, lipids, serum chemistries.  Ordered tests.   Risk of complications and morbidities: Is definite because of use of insulin and risk of hypoglycemia.  Prescription medications reviewed and modifications made.  Compliance assessed.  Addressed social determinants of health including food insecurity.

## 2024-09-25 NOTE — PATIENT INSTRUCTIONS
Decrease Basaglar 22 units twice daily     No Novolog at bedtime     Keep up the great work!  Lab Results   Component Value Date    HGBA1C 6.6 (A) 09/25/2024

## 2024-12-02 DIAGNOSIS — Z79.4 TYPE 2 DIABETES MELLITUS WITH HYPERGLYCEMIA, WITH LONG-TERM CURRENT USE OF INSULIN: Primary | ICD-10-CM

## 2024-12-02 DIAGNOSIS — E11.65 TYPE 2 DIABETES MELLITUS WITH HYPERGLYCEMIA, WITH LONG-TERM CURRENT USE OF INSULIN: Primary | ICD-10-CM

## 2024-12-02 RX ORDER — PEN NEEDLE, DIABETIC 30 GX3/16"
NEEDLE, DISPOSABLE MISCELLANEOUS
COMMUNITY
End: 2024-12-02 | Stop reason: SDUPTHER

## 2024-12-02 RX ORDER — PEN NEEDLE, DIABETIC 30 GX3/16"
NEEDLE, DISPOSABLE MISCELLANEOUS
Qty: 500 EACH | Refills: 3 | Status: SHIPPED | OUTPATIENT
Start: 2024-12-02

## 2024-12-09 ENCOUNTER — APPOINTMENT (OUTPATIENT)
Dept: AUDIOLOGY | Facility: CLINIC | Age: 84
End: 2024-12-09
Payer: MEDICARE

## 2024-12-13 ENCOUNTER — APPOINTMENT (OUTPATIENT)
Dept: AUDIOLOGY | Facility: CLINIC | Age: 84
End: 2024-12-13
Payer: MEDICARE

## 2024-12-13 ENCOUNTER — APPOINTMENT (OUTPATIENT)
Dept: OTOLARYNGOLOGY | Facility: CLINIC | Age: 84
End: 2024-12-13
Payer: MEDICARE

## 2024-12-13 VITALS — BODY MASS INDEX: 30.76 KG/M2 | HEIGHT: 67 IN | WEIGHT: 196 LBS

## 2024-12-13 DIAGNOSIS — H93.13 TINNITUS OF BOTH EARS: ICD-10-CM

## 2024-12-13 DIAGNOSIS — H90.3 SENSORINEURAL HEARING LOSS (SNHL) OF BOTH EARS: Primary | ICD-10-CM

## 2024-12-13 DIAGNOSIS — H90.3 ASYMMETRICAL SENSORINEURAL HEARING LOSS: ICD-10-CM

## 2024-12-13 DIAGNOSIS — H90.3 ASYMMETRIC SENSORINEURAL DEAFNESS: ICD-10-CM

## 2024-12-13 DIAGNOSIS — H93.13 TINNITUS OF BOTH EARS: Primary | ICD-10-CM

## 2024-12-13 PROCEDURE — 99204 OFFICE O/P NEW MOD 45 MIN: CPT | Performed by: OTOLARYNGOLOGY

## 2024-12-13 PROCEDURE — 92567 TYMPANOMETRY: CPT | Performed by: AUDIOLOGIST

## 2024-12-13 PROCEDURE — 1159F MED LIST DOCD IN RCRD: CPT | Performed by: OTOLARYNGOLOGY

## 2024-12-13 PROCEDURE — 92557 COMPREHENSIVE HEARING TEST: CPT | Performed by: AUDIOLOGIST

## 2024-12-13 PROCEDURE — 1036F TOBACCO NON-USER: CPT | Performed by: OTOLARYNGOLOGY

## 2024-12-13 NOTE — PROGRESS NOTES
Chief Complaint   Patient presents with    New Patient Visit     NP- AUDIO FOLLOW UP, TINNITUS      Date of Evaluation: 12/13/2024   HPI  Masoud Marie is a 84 y.o. male here for evaluation of tinnitus at the request of Dr. Hudson.  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  He is left-handed.  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  He is bothered by bilateral tinnitus.  He is aware of some hearing loss.  His audiogram shows asymmetric right worse than left sensorineural hearing loss with decreased word recognition on the right.       Past Medical History:   Diagnosis Date    History of kidney cancer 11/2008    Hyperlipidemia     Hypertension     MI (myocardial infarction) (Multi) 1993    Personal history of other diseases of the nervous system and sense organs     History of cataract    Retinal edema     Macular edema    Stroke (cerebrum) (Multi) 07/2020    Type 2 diabetes mellitus       Past Surgical History:   Procedure Laterality Date    CARDIAC SURGERY  01/14/2014    Heart Surgery    CATARACT EXTRACTION  11/02/2018    Cataract Surgery    MR HEAD ANGIO WO IV CONTRAST  9/25/2020    MR HEAD ANGIO WO IV CONTRAST [facility]    OTHER SURGICAL HISTORY  01/14/2014    Percutaneous Intracranial Balloon Angioplasty    OTHER SURGICAL HISTORY  08/28/2018    [employer] Bevacizumab          Medications:   Current Outpatient Medications   Medication Instructions    acetaminophen (TYLENOL ARTHRITIS PAIN) 1,300 mg, Every 4 hours PRN    amLODIPine (NORVASC) 5 mg, Daily    aspirin 81 mg, Daily    atorvastatin (LIPITOR) 10 mg, Daily    carvedilol (Coreg) 25 mg tablet 1 tablet, oral, 2 times daily (morning and late afternoon)    cholecalciferol (Vitamin D-3) 125 MCG (5000 UT) capsule as directed Orally    cyanocobalamin (VITAMIN B-12) 500 mcg, Daily    docusate sodium (COLACE) 100 mg, 2 times daily    dofetilide (TIKOSYN) 250 mcg, 2 times daily    finasteride (PROSCAR)  "5 mg, Daily    FreeStyle Nimesh 2 [place] misc As needed    FreeStyle Nimesh 2 Sensor kit As needed    gabapentin (NEURONTIN) 100 mg, 2 times daily    glucosamine-D3-Boswellia serr (Glucosamine Daily Complex) 1,500-400-100 mg-unit-mg tablet 2 tablets, Daily    insulin aspart (NovoLOG Flexpen U-100 Insulin) 100 unit/mL (3 mL) pen INJECT WITH MEALS PER SLIDING SCALE A TOTAL OF 45 UNITS DAILY    insulin glargine (Basaglar KwikPen U-100 Insulin) 100 unit/mL (3 mL) pen INJECT 35 UNITS UNDER THE SKIN IN THE MORNING AND 50 UNITS EVERY EVENING    isosorbide mononitrate ER (IMDUR) 30 mg, 2 times daily    losartan (COZAAR) 50 mg, Daily    OneTouch Ultra Test strip OneTouch Ultra Blue STRP   Quantity: 300  Refills: 0        Start : 4-Apr-2018   Active    pen needle, diabetic (BD Ultra-Fine Leydi Pen Needle) 32 gauge x 5/32\" needle Use as directed 5 times daily    polyethylene glycol (Miralax) 17 gram/dose powder MiraLax POWD   Refills: 0       Active    sennosides-docusate sodium (Radha-Colace) 8.6-50 mg tablet 1 tablet, 2 times daily        Allergies:  No Known Allergies     Physical Exam:  Last Recorded Vitals  Height 1.702 m (5' 7\"), weight 88.9 kg (196 lb). , Body mass index is 30.7 kg/m².  []General appearance: Well-developed, well-nourished in no acute distress, conversant with normal voice quality    Head/face: No erythema or edema or facial tenderness, and normal facial nerve function bilaterally    External ear: Clear external auditory canals with normal pinnae  Tube status: N/A  Middle ear: Tympanic membranes intact and mobile, middle ears normal.  Tympanic membrane perforation: N/A  Mastoid bowl: N/A  Hearing: Normal conversational awareness at normal speech thresholds    Nose visualized using: Anterior rhinoscopy  Nasal dorsum: Nontraumatic midline appearance  Septum: Midline, nonobstructing  Inferior turbinates: Normal, pink  Secretions: Dry    Oral cavity and oropharynx: Normal  Teeth: Good condition  Floor of mouth: " without lesions  Palate: Normal hard palate, soft palate and uvula  Oropharynx: Clear, no lesions present  Buccal mucosa: Normal without masses or lesions  Lips: Normal    Nasopharynx: Inadequate mirror exam secondary to gag/anatomy    Neck:  Salivary glands: Normal bilateral parotid and submandibular glands by inspection and palpation.  Non-thyroid masses: No palpable masses or significant lymphadenopathy  Trachea: Midline  Thyroid: No thyromegaly or palpable nodules  Temporomandibular joint: Nontender  Cervical range of motion: Normal    Neurologic exam: Alert and oriented x3, appropriate affect.  Cranial nerves II-XII normal bilaterally  Extraocular movement: Extraocular movement intact, normal gaze alignment        Masoud was seen today for new patient visit.  Diagnoses and all orders for this visit:  Sensorineural hearing loss (SNHL) of both ears (Primary)  Asymmetric sensorineural deafness  Tinnitus of both ears       PLAN  We have had a long discussion regarding his hearing loss.  I believe his asymmetry is due to his noise exposure as a left-handed  as well as his previous CVA.  I believe the hearing loss is the main  of his tinnitus.  I have recommended hearing aid evaluation.  We reviewed his audiogram with him and his daughter.  All of their questions were answered    Dakota Bolaños MD     None available

## 2024-12-16 NOTE — PROGRESS NOTES
AUDIOLOGY ADULT AUDIOMETRIC EVALUATION    Name:  Masoud Marie  :  1940  Age:  84 y.o.  Date of Evaluation:  2024    Reason for visit: Mr. Marie is seen in the clinic today at the request of Dakota Bolaños MD in otolaryngology for an audiologic evaluation.     HISTORY  The patient reported experiencing bilateral tinnitus and hearing loss for years.  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       EVALUATION  See scanned audiogram: “Media” > “Audiology Report”.      RESULTS  Otoscopic Evaluation:  Right Ear: clear ear canal  Left Ear: clear ear canal    Immittance Measures:  Tympanometry:  Right Ear: Type A, normal tympanic membrane mobility with normal middle ear pressure   Left Ear: Type A, normal tympanic membrane mobility with normal middle ear pressure     Acoustic Reflexes:  Ipsilateral Right Ear: Could not evaluate since an adequate seal could not be maintained    Ipsilateral Left Ear: Could not evaluate since an adequate seal could not be maintained    Contralateral Right Ear: did not evaluate  Contralateral Left Ear: did not evaluate    Distortion Product Otoacoustic Emissions (DPOAEs):  Right Ear: did not evaluate   Left Ear: did not evaluate     Audiometry:  Test Technique and Reliability:   Standard audiometry via insert earphones/supra-aural headphones. Reliability is good.    Pure tone air and bone conduction audiometry:  Right Ear: mild sloping to severe sensorineural hearing loss at 750-8000 Hz  Left Ear: mild sloping to severe sensorineural hearing loss at 7415-7138 Hz    Speech Audiometry (Word Recognition Scores):   Right Ear: Fair, 72% in quiet at an elevated presentation level   Left Ear: Excellent, 92% in quiet at an elevated presentation level     IMPRESSIONS  Results of today's audiometric evaluation revealed a bilateral sensorineural hearing loss.   Results of tympanometry testing indicated normal middle ear function in both  ears.    RECOMMENDATIONS  - Follow up with otolaryngology today as scheduled.  - Annual audiologic evaluation, sooner if an acute change is noted.  - Consider hearing aids. Contact insurance to determine if there is an applicable benefit and where it can be used. Contact our office to schedule an appointment should he wish to proceed with hearing aids through our clinic.    PATIENT EDUCATION  Discussed results, impressions and recommendations with the patient. Questions were addressed and the patient was encouraged to contact our office should concerns arise.    Time for this encounter: 10:45-11:15    Mirella Leong M.A., CCC-A   Licensed Audiologist

## 2025-01-24 NOTE — PROGRESS NOTES
HPI   83 yo with Diabetes 2(dx mid 1990's,) HTN, Dyslipidemia, CVD (MI/cabg) irregular heart rhythm, CVA (L sided numbness), renal cancer, short term memory loss here for f/u. A1c was 6.6%, today 7.4%.     Testing sugars 4 times per day with nimesh 2.  Pt is struggling following a carb controlled diet and knows reasonable carb allowances. Pt is able to afford their medications.                    Pt taking basaglar 25-0-0-25, (asked pt to lower, he never did),  taking novolog scale 100-120 9 units, 121-160 10units, 161-200 11 units, etc, forgets some doses, Pt not interested in carb counting.     -Nimesh 2 data for 14 days:  67% in range, 1% low,  pattern: mid to low 100's overnight/waking, mid to low 100's through the day, evening low 100's.        Taking atorvastatin 10mg for lipids and tolerating.          Taking losartan 50mg, amlodipine 5mg, carvedilol 25mg bid, isordil 30mg, lasix 20mg, tikosyn for htn/heart.      -recently hospitalized for pneumonia    [facility] labs:11/24 cr-0.93  10/24 lft-wnl  7/24 ldl-50      Current Outpatient Medications:     acetaminophen (Tylenol Arthritis Pain) 650 mg ER tablet, Take 2 tablets (1,300 mg) by mouth every 4 hours if needed., Disp: , Rfl:     amLODIPine (Norvasc) 5 mg tablet, Take 1 tablet (5 mg) by mouth once daily., Disp: , Rfl:     aspirin 81 mg chewable tablet, Chew 1 tablet (81 mg) once daily., Disp: , Rfl:     atorvastatin (Lipitor) 10 mg tablet, Take 1 tablet (10 mg) by mouth once daily., Disp: , Rfl:     cholecalciferol (Vitamin D-3) 125 MCG (5000 UT) capsule, as directed Orally, Disp: , Rfl:     cyanocobalamin (Vitamin B-12) 500 mcg tablet, Take 1 tablet (500 mcg) by mouth once daily., Disp: , Rfl:     docusate sodium (Colace) 100 mg capsule, Take 1 capsule (100 mg) by mouth 2 times a day., Disp: , Rfl:     dofetilide (Tikosyn) 250 mcg capsule, Take 1 capsule (250 mcg) by mouth twice a day., Disp: , Rfl:     finasteride (Proscar) 5 mg tablet, Take 1 tablet (5 mg) by  "mouth once daily., Disp: , Rfl:     FreeStyle Nimesh 2 [place] misc, Inject under the skin if needed. Use as instructed, Disp: , Rfl:     FreeStyle Nimesh 2 Sensor kit, Inject under the skin if needed. Use as instructed, Disp: , Rfl:     furosemide (Lasix) 20 mg tablet, Take 1 tablet (20 mg) by mouth once daily., Disp: , Rfl:     gabapentin (Neurontin) 100 mg capsule, Take 1 capsule (100 mg) by mouth 2 times a day., Disp: , Rfl:     glucosamine-D3-Boswellia serr (Glucosamine Daily Complex) 1,500-400-100 mg-unit-mg tablet, Take 2 tablets by mouth once daily., Disp: , Rfl:     ipratropium-albuteroL (Duo-Neb) 0.5-2.5 mg/3 mL nebulizer solution, Inhale 3 mL every 4 hours if needed., Disp: , Rfl:     isosorbide mononitrate ER (Imdur) 30 mg 24 hr tablet, Take 1 tablet (30 mg) by mouth twice a day., Disp: , Rfl:     losartan (Cozaar) 50 mg tablet, Take 1 tablet (50 mg) by mouth once daily., Disp: , Rfl:     OneTouch Ultra Test strip, OneTouch Ultra Blue STRP  Quantity: 300  Refills: 0      Start : 4-Apr-2018  Active, Disp: , Rfl:     pen needle, diabetic (BD Ultra-Fine Leydi Pen Needle) 32 gauge x 5/32\" needle, Use as directed 5 times daily, Disp: 500 each, Rfl: 3    polyethylene glycol (Miralax) 17 gram/dose powder, MiraLax POWD  Refills: 0     Active, Disp: , Rfl:     sennosides-docusate sodium (Radha-Colace) 8.6-50 mg tablet, Take 1 tablet by mouth twice a day., Disp: , Rfl:     triamcinolone (Kenalog) 0.025 % ointment, Apply 1 Application topically twice a day., Disp: , Rfl:     insulin aspart (NovoLOG Flexpen U-100 Insulin) 100 unit/mL (3 mL) pen, Use as directed up to a total of 50 units per day, Take as directed per insulin instructions., Disp: 45 mL, Rfl: 3    insulin glargine (Basaglar KwikPen U-100 Insulin) 100 unit/mL (3 mL) pen, 23 units bid Take as directed per insulin instructions., Disp: 50 mL, Rfl: 3      Allergies as of 01/27/2025    (No Known Allergies)         Review of Systems   Cardiology: " "Lightheadedness-denies.  Chest pain-denies.  Leg edema-denies.  Palpitations-denies.  Respiratory: Cough-denies. Shortness of breath-at times.  Wheezing-denies.  Gastroenterology: Constipation-on meds for.  Diarrhea-denies.  Heartburn-denies.  Endocrinology: Cold intolerance-denies.  Heat intolerance-denies.  Sweats-denies.  Neurology: Headache-denies.  Tremor-denies.  Neuropathy in extremities-denies.  Psychology: Low energy-denies.  Irritability-denies.  Sleep disturbances-denies.      /70 (BP Location: Right arm, Patient Position: Sitting)   Pulse 90   Ht 1.702 m (5' 7\")   Wt 89.4 kg (197 lb)   BMI 30.85 kg/m²       Labs:  Lab Results   Component Value Date    WBC 7.1 06/10/2022    NRBC 0 06/10/2022    RBC 4.38 (L) 06/10/2022    HGB 12.6 (L) 06/10/2022    HCT 38.8 (L) 06/10/2022     06/10/2022     Lab Results   Component Value Date    CALCIUM 10.0 06/10/2022    AST 18 06/10/2022    ALKPHOS 63 06/10/2022    BILITOT 0.3 06/10/2022    PROT 6.7 06/10/2022    ALBUMIN 4.1 06/10/2022    GLOB 2.6 06/10/2022    AGR 1.6 06/10/2022     06/10/2022    K 4.4 06/10/2022     06/10/2022    CO2 26 06/10/2022    ANIONGAP 9 06/10/2022    BUN 18 06/10/2022    CREATININE 0.9 06/10/2022    UREACREAUR 20.0 06/10/2022    GLUCOSE 71 06/10/2022    ALT 18 06/10/2022    EGFR 84 06/10/2022     Lab Results   Component Value Date    CHOL 109 (L) 06/10/2022    TRIG 146 06/10/2022    HDL 30 (L) 06/10/2022    LDLCALC 50 (L) 06/10/2022     Lab Results   Component Value Date    MICROALBCREA 31.0 (H) 06/10/2022     Lab Results   Component Value Date    TSH 2.06 07/11/2019     Lab Results   Component Value Date    [id number] 930 07/05/2022     Lab Results   Component Value Date    HGBA1C 7.4 (A) 01/27/2025         Physical Exam   General Appearance: pleasant, cooperative, no acute distress  HEENT: no chemosis, no proptosis, no lid lag, no lid retraction  Neck: no lymphadenopathy, no thyromegaly, no dominant thyroid " nodules  Heart: no murmurs, regular rate and rhythm, S1 and S2  Lungs: no wheezes, no rhonci, no rales  Extremities: no lower extremity swelling      Assessment/Plan   1. Type 2 diabetes mellitus with hyperglycemia, with long-term current use of insulin (Primary)  -A1c ordered and reviewed  -margareth data reviewed  -labs reviewed  -refills sent    -still with trend of borderline low overnight into the am  -once again asked pt to lower basaglar from 25 to 23 units bid  -target A1c in the mid 7% range without lows    2. Essential (primary) hypertension  -near target on therapy, will follow    3. Hypercholesterolemia  -on statin at target, labs reviewed, will follow         Follow Up:  6 months pharmD    Medical Decision Making  Complexity of problem: Chronic illness of diabetes mellitus uncontrolled, progressing  Data analyzed and reviewed: Reviewed prior notes, blood glucose data, labs including HgbA1c, lipids, serum chemistries.  Ordered tests.   Risk of complications and morbidities: Is definite because of use of insulin and risk of hypoglycemia.  Prescription medications reviewed and modifications made.  Compliance assessed.  Addressed social determinants of health including food insecurity.

## 2025-01-27 ENCOUNTER — APPOINTMENT (OUTPATIENT)
Dept: ENDOCRINOLOGY | Facility: CLINIC | Age: 85
End: 2025-01-27
Payer: MEDICARE

## 2025-01-27 VITALS
BODY MASS INDEX: 30.92 KG/M2 | HEART RATE: 90 BPM | WEIGHT: 197 LBS | DIASTOLIC BLOOD PRESSURE: 70 MMHG | SYSTOLIC BLOOD PRESSURE: 144 MMHG | HEIGHT: 67 IN

## 2025-01-27 DIAGNOSIS — I10 ESSENTIAL (PRIMARY) HYPERTENSION: ICD-10-CM

## 2025-01-27 DIAGNOSIS — E11.9 TYPE 2 DIABETES MELLITUS WITHOUT COMPLICATION, WITH LONG-TERM CURRENT USE OF INSULIN (MULTI): ICD-10-CM

## 2025-01-27 DIAGNOSIS — E78.00 HYPERCHOLESTEROLEMIA: ICD-10-CM

## 2025-01-27 DIAGNOSIS — Z79.4 TYPE 2 DIABETES MELLITUS WITHOUT COMPLICATION, WITH LONG-TERM CURRENT USE OF INSULIN (MULTI): ICD-10-CM

## 2025-01-27 DIAGNOSIS — E11.65 TYPE 2 DIABETES MELLITUS WITH HYPERGLYCEMIA, WITH LONG-TERM CURRENT USE OF INSULIN: Primary | ICD-10-CM

## 2025-01-27 DIAGNOSIS — Z79.4 TYPE 2 DIABETES MELLITUS WITH HYPERGLYCEMIA, WITH LONG-TERM CURRENT USE OF INSULIN: Primary | ICD-10-CM

## 2025-01-27 LAB — POC HEMOGLOBIN A1C: 7.4 % (ref 4.2–6.5)

## 2025-01-27 PROCEDURE — 1159F MED LIST DOCD IN RCRD: CPT | Performed by: INTERNAL MEDICINE

## 2025-01-27 PROCEDURE — 1126F AMNT PAIN NOTED NONE PRSNT: CPT | Performed by: INTERNAL MEDICINE

## 2025-01-27 PROCEDURE — 3078F DIAST BP <80 MM HG: CPT | Performed by: INTERNAL MEDICINE

## 2025-01-27 PROCEDURE — 99214 OFFICE O/P EST MOD 30 MIN: CPT | Performed by: INTERNAL MEDICINE

## 2025-01-27 PROCEDURE — 1036F TOBACCO NON-USER: CPT | Performed by: INTERNAL MEDICINE

## 2025-01-27 PROCEDURE — 3077F SYST BP >= 140 MM HG: CPT | Performed by: INTERNAL MEDICINE

## 2025-01-27 PROCEDURE — 83036 HEMOGLOBIN GLYCOSYLATED A1C: CPT | Performed by: INTERNAL MEDICINE

## 2025-01-27 RX ORDER — INSULIN GLARGINE 100 [IU]/ML
INJECTION, SOLUTION SUBCUTANEOUS
Qty: 50 ML | Refills: 3 | Status: SHIPPED | OUTPATIENT
Start: 2025-01-27

## 2025-01-27 RX ORDER — TRIAMCINOLONE ACETONIDE 0.25 MG/G
1 OINTMENT TOPICAL 2 TIMES DAILY
COMMUNITY
Start: 2024-08-21

## 2025-01-27 RX ORDER — FUROSEMIDE 20 MG/1
20 TABLET ORAL
COMMUNITY
Start: 2025-01-16

## 2025-01-27 RX ORDER — IPRATROPIUM BROMIDE AND ALBUTEROL SULFATE 2.5; .5 MG/3ML; MG/3ML
3 SOLUTION RESPIRATORY (INHALATION) EVERY 4 HOURS PRN
COMMUNITY
Start: 2024-07-03

## 2025-01-27 RX ORDER — INSULIN ASPART 100 [IU]/ML
INJECTION, SOLUTION INTRAVENOUS; SUBCUTANEOUS
Qty: 45 ML | Refills: 3 | Status: SHIPPED | OUTPATIENT
Start: 2025-01-27

## 2025-01-27 ASSESSMENT — ENCOUNTER SYMPTOMS
DEPRESSION: 0
LOSS OF SENSATION IN FEET: 0
OCCASIONAL FEELINGS OF UNSTEADINESS: 0

## 2025-01-27 ASSESSMENT — PAIN SCALES - GENERAL: PAINLEVEL_OUTOF10: 0-NO PAIN

## 2025-02-27 ENCOUNTER — APPOINTMENT (OUTPATIENT)
Dept: OTOLARYNGOLOGY | Facility: CLINIC | Age: 85
End: 2025-02-27
Payer: MEDICARE

## 2025-03-27 ENCOUNTER — APPOINTMENT (OUTPATIENT)
Dept: OPHTHALMOLOGY | Facility: CLINIC | Age: 85
End: 2025-03-27
Payer: MEDICARE

## 2025-03-27 DIAGNOSIS — H52.223 REGULAR ASTIGMATISM OF BOTH EYES WITH PRESBYOPIA: Primary | ICD-10-CM

## 2025-03-27 DIAGNOSIS — E11.3312 MODERATE NONPROLIFERATIVE DIABETIC RETINOPATHY OF LEFT EYE WITH MACULAR EDEMA ASSOCIATED WITH TYPE 2 DIABETES MELLITUS: ICD-10-CM

## 2025-03-27 DIAGNOSIS — E11.3291 MILD NONPROLIFERATIVE DIABETIC RETINOPATHY OF RIGHT EYE WITHOUT MACULAR EDEMA ASSOCIATED WITH TYPE 2 DIABETES MELLITUS: ICD-10-CM

## 2025-03-27 DIAGNOSIS — Z96.1 PSEUDOPHAKIA, BOTH EYES: ICD-10-CM

## 2025-03-27 DIAGNOSIS — H52.4 REGULAR ASTIGMATISM OF BOTH EYES WITH PRESBYOPIA: Primary | ICD-10-CM

## 2025-03-27 PROBLEM — Z97.3 WEARS EYEGLASSES: Status: RESOLVED | Noted: 2023-09-02 | Resolved: 2025-03-27

## 2025-03-27 PROBLEM — H52.203 ASTIGMATISM OF BOTH EYES: Status: RESOLVED | Noted: 2023-09-02 | Resolved: 2025-03-27

## 2025-03-27 PROBLEM — E10.3393: Status: RESOLVED | Noted: 2023-09-02 | Resolved: 2025-03-27

## 2025-03-27 PROBLEM — E11.311 DIABETIC MACULAR EDEMA: Status: RESOLVED | Noted: 2023-09-02 | Resolved: 2025-03-27

## 2025-03-27 PROBLEM — H25.813 COMBINED FORMS OF AGE-RELATED CATARACT OF BOTH EYES: Status: RESOLVED | Noted: 2023-09-02 | Resolved: 2025-03-27

## 2025-03-27 PROBLEM — Z98.42 CATARACT EXTRACTION STATUS OF LEFT EYE: Status: RESOLVED | Noted: 2023-09-02 | Resolved: 2025-03-27

## 2025-03-27 PROBLEM — H01.009 BLEPHARITIS: Status: RESOLVED | Noted: 2023-09-02 | Resolved: 2025-03-27

## 2025-03-27 PROCEDURE — 92015 DETERMINE REFRACTIVE STATE: CPT | Performed by: STUDENT IN AN ORGANIZED HEALTH CARE EDUCATION/TRAINING PROGRAM

## 2025-03-27 PROCEDURE — 92012 INTRM OPH EXAM EST PATIENT: CPT | Performed by: STUDENT IN AN ORGANIZED HEALTH CARE EDUCATION/TRAINING PROGRAM

## 2025-03-27 ASSESSMENT — REFRACTION_MANIFEST
OD_AXIS: 180
OS_CYLINDER: +1.75
OD_CYLINDER: +0.50
OS_AXIS: 005
OS_SPHERE: -0.75
OS_ADD: +2.50
OD_ADD: +2.50
OD_SPHERE: -1.00

## 2025-03-27 ASSESSMENT — REFRACTION_WEARINGRX
OS_ADD: +2.75
OS_SPHERE: -0.50
OS_CYLINDER: +1.25
OD_CYLINDER: +0.50
OD_AXIS: 180
OD_ADD: +2.50
OS_AXIS: 005
OS_AXIS: 005
OD_AXIS: 180
OD_SPHERE: -1.50
OD_ADD: +2.75
OS_SPHERE: -1.00
OS_ADD: +2.50
OD_CYLINDER: +0.50
OD_SPHERE: -1.00
OS_CYLINDER: +1.25

## 2025-03-27 ASSESSMENT — ENCOUNTER SYMPTOMS
RESPIRATORY NEGATIVE: 0
GASTROINTESTINAL NEGATIVE: 0
CARDIOVASCULAR NEGATIVE: 0
CONSTITUTIONAL NEGATIVE: 0
MUSCULOSKELETAL NEGATIVE: 0
ALLERGIC/IMMUNOLOGIC NEGATIVE: 0
HEMATOLOGIC/LYMPHATIC NEGATIVE: 0
NEUROLOGICAL NEGATIVE: 0
ENDOCRINE NEGATIVE: 0
EYES NEGATIVE: 1
PSYCHIATRIC NEGATIVE: 0

## 2025-03-27 ASSESSMENT — EXTERNAL EXAM - RIGHT EYE: OD_EXAM: NORMAL

## 2025-03-27 ASSESSMENT — VISUAL ACUITY
OD_PH_CC+: -2
OD_CC+: -2
OS_CC: 20/25
METHOD: SNELLEN - LINEAR
OD_PH_CC: 20/25
OD_CC: 20/40
OS_CC+: -1
CORRECTION_TYPE: GLASSES

## 2025-03-27 ASSESSMENT — EXTERNAL EXAM - LEFT EYE: OS_EXAM: NORMAL

## 2025-03-27 ASSESSMENT — TONOMETRY
IOP_METHOD: TONOPEN
OS_IOP_MMHG: 13
OD_IOP_MMHG: 13

## 2025-03-27 ASSESSMENT — CONF VISUAL FIELD
OD_INFERIOR_NASAL_RESTRICTION: 0
OD_INFERIOR_TEMPORAL_RESTRICTION: 0
OS_NORMAL: 1
OS_SUPERIOR_NASAL_RESTRICTION: 0
OS_SUPERIOR_TEMPORAL_RESTRICTION: 0
OS_INFERIOR_TEMPORAL_RESTRICTION: 0
OS_INFERIOR_NASAL_RESTRICTION: 0
OD_SUPERIOR_NASAL_RESTRICTION: 0
OD_NORMAL: 1
OD_SUPERIOR_TEMPORAL_RESTRICTION: 0

## 2025-03-27 ASSESSMENT — SLIT LAMP EXAM - LIDS
COMMENTS: GOOD POSITION
COMMENTS: GOOD POSITION

## 2025-03-27 ASSESSMENT — CUP TO DISC RATIO
OD_RATIO: .4
OS_RATIO: .45

## 2025-03-27 ASSESSMENT — PACHYMETRY
OS_CT(UM): 526
OD_CT(UM): 540

## 2025-03-27 NOTE — PROGRESS NOTES
Assessment/Plan   Diagnoses and all orders for this visit:  Regular astigmatism of both eyes with presbyopia  Pseudophakia, both eyes  -New spec rx released today per patient request. Monitor 1 year or sooner prn. Refraction billed today.  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

## 2025-07-28 ENCOUNTER — APPOINTMENT (OUTPATIENT)
Dept: ENDOCRINOLOGY | Facility: CLINIC | Age: 85
End: 2025-07-28
Payer: MEDICARE

## 2025-07-28 ENCOUNTER — TELEPHONE (OUTPATIENT)
Facility: CLINIC | Age: 85
End: 2025-07-28

## 2025-07-28 VITALS
HEART RATE: 59 BPM | DIASTOLIC BLOOD PRESSURE: 72 MMHG | BODY MASS INDEX: 32.26 KG/M2 | WEIGHT: 206 LBS | SYSTOLIC BLOOD PRESSURE: 132 MMHG

## 2025-07-28 DIAGNOSIS — E11.65 TYPE 2 DIABETES MELLITUS WITH HYPERGLYCEMIA, WITH LONG-TERM CURRENT USE OF INSULIN: Primary | ICD-10-CM

## 2025-07-28 DIAGNOSIS — I10 ESSENTIAL (PRIMARY) HYPERTENSION: ICD-10-CM

## 2025-07-28 DIAGNOSIS — Z79.4 TYPE 2 DIABETES MELLITUS WITH HYPERGLYCEMIA, WITH LONG-TERM CURRENT USE OF INSULIN: Primary | ICD-10-CM

## 2025-07-28 DIAGNOSIS — E78.00 HYPERCHOLESTEROLEMIA: ICD-10-CM

## 2025-07-28 LAB — POC HEMOGLOBIN A1C: 7.7 % (ref 4.2–6.5)

## 2025-07-28 PROCEDURE — 83036 HEMOGLOBIN GLYCOSYLATED A1C: CPT | Mod: QW | Performed by: PHARMACIST

## 2025-07-28 PROCEDURE — 95251 CONT GLUC MNTR ANALYSIS I&R: CPT | Performed by: INTERNAL MEDICINE

## 2025-07-28 PROCEDURE — 99214 OFFICE O/P EST MOD 30 MIN: CPT | Performed by: PHARMACIST

## 2025-07-28 PROCEDURE — 99214 OFFICE O/P EST MOD 30 MIN: CPT | Performed by: INTERNAL MEDICINE

## 2025-07-28 ASSESSMENT — PAIN SCALES - GENERAL: PAINLEVEL_OUTOF10: 0-NO PAIN

## 2025-07-28 ASSESSMENT — ENCOUNTER SYMPTOMS
DEPRESSION: 0
OCCASIONAL FEELINGS OF UNSTEADINESS: 0
LOSS OF SENSATION IN FEET: 0

## 2025-07-28 NOTE — PATIENT INSTRUCTIONS
Dose Novolog BEFORE all meals  -if dosing during/after meals, dose LOWEST amount of units, do not use sliding scale

## 2025-07-28 NOTE — PROGRESS NOTES
HPI     86 yo with Diabetes 2(dx mid 1990's,) HTN, Dyslipidemia, CVD (MI/cabg) irregular heart rhythm, CVA (L sided numbness), renal cancer, short term memory loss here for f/u. A1c was 7.4%, today 7.7%.     Testing sugars 4 times per day with nimesh 2 /reader.  Pt is struggling following a carb controlled diet and knows reasonable carb allowances. Pt is able to afford their medications.                    Pt taking basaglar 23-0-0-23, (decr last visit from 25u bid), novolog scale 100-120 9 units, 121-160 10units, 161-200 11 units, etc, forgets some doses, Pt not interested in carb counting.     -Nimesh 2 data for 30 days:  55% in range, 0% low,  pattern: mid to low 100's overnight, upper 100's waking, low to mid 200's after lunch, mid to upper 100's hs         Taking atorvastatin 10mg for lipids and tolerating.          Taking losartan 50mg, amlodipine 5mg, carvedilol 25mg bid, isordil 30mg, lasix 20mg, tikosyn for htn/heart.      -recently hospitalized for pneumonia    [facility] labs:11/24 cr-0.93  10/24 lft-wnl  7/24 ldl-50      Current Medications[1]    Allergies as of 07/28/2025    (No Known Allergies)       /72 (BP Location: Right arm, Patient Position: Sitting, BP Cuff Size: Adult)   Pulse 59   Wt 93.4 kg (206 lb)   BMI 32.26 kg/m²     Labs:   Lab Results   Component Value Date    WBC 7.1 06/10/2022    NRBC 0 06/10/2022    RBC 4.38 (L) 06/10/2022    HGB 12.6 (L) 06/10/2022    HCT 38.8 (L) 06/10/2022     06/10/2022     Lab Results   Component Value Date    CALCIUM 10.0 06/10/2022    AST 18 06/10/2022    ALKPHOS 63 06/10/2022    BILITOT 0.3 06/10/2022    PROT 6.7 06/10/2022    ALBUMIN 4.1 06/10/2022    GLOB 2.6 06/10/2022    AGR 1.6 06/10/2022     06/10/2022    K 4.4 06/10/2022     06/10/2022    CO2 26 06/10/2022    ANIONGAP 9 06/10/2022    BUN 18 06/10/2022    CREATININE 0.9 06/10/2022    UREACREAUR 20.0 06/10/2022    GLUCOSE 71 06/10/2022    ALT 18 06/10/2022    EGFR 84 06/10/2022     Lab  Results   Component Value Date    CHOL 109 (L) 06/10/2022    TRIG 146 06/10/2022    HDL 30 (L) 06/10/2022    LDLCALC 50 (L) 06/10/2022     Lab Results   Component Value Date    MICROALBCREA 31.0 (H) 06/10/2022     Lab Results   Component Value Date    TSH 2.06 07/11/2019     Lab Results   Component Value Date    [id number] 930 07/05/2022     Lab Results   Component Value Date    HGBA1C 7.7 (A) 07/28/2025         Assessment/Plan   1. Type 2 diabetes mellitus with hyperglycemia, with long-term current use of insulin (Primary)    -A1c ordered & reviewed  -[facility] July 2024 labs reviewed  -margareth data reviewed, scanned into epic    -admits forgetting to bolus, especially at lunch   -work on compliance, dosing before all meals  -only use sliding scale with pre-meal sugar, if dosing during/after use only base units     2. Essential (primary) hypertension  -at target on therapy after resting     3. Hypercholesterolemia  -on statin, tolerating       Follow up: 6mon bb    -labs/tests/notes reviewed  -reviewed and counseled patient on medication monitoring and side effects    Treatment and plan discussed with Dr. Jiménez.  JOSE Lacy, PharmD, [facility], [facility].     Medical Decision Making  Complexity of problem: Chronic illness of diabetes mellitus uncontrolled, progressing  Data analyzed and reviewed: Reviewed prior notes, blood glucose data, labs including HgbA1c, lipids, serum chemistries.  Ordered tests.   Risk of complications and morbidities: Is definite because of use of insulin and risk of hypoglycemia.  Prescription medications reviewed and modifications made.  Compliance assessed.  Addressed social determinants of health including food insecurity.         [1]   Current Outpatient Medications:     acetaminophen (Tylenol Arthritis Pain) 650 mg ER tablet, Take 2 tablets (1,300 mg) by mouth every 4 hours if needed., Disp: , Rfl:     amLODIPine (Norvasc) 5 mg tablet, Take 1 tablet (5 mg) by mouth once daily., Disp: , Rfl:      aspirin 81 mg chewable tablet, Chew and swallow 1 tablet (81 mg) once daily., Disp: , Rfl:     atorvastatin (Lipitor) 10 mg tablet, Take 1 tablet (10 mg) by mouth once daily., Disp: , Rfl:     cholecalciferol (Vitamin D-3) 125 MCG (5000 UT) capsule, as directed Orally, Disp: , Rfl:     cyanocobalamin (Vitamin B-12) 500 mcg tablet, Take 1 tablet (500 mcg) by mouth once daily., Disp: , Rfl:     docusate sodium (Colace) 100 mg capsule, Take 1 capsule (100 mg) by mouth 2 times a day., Disp: , Rfl:     dofetilide (Tikosyn) 250 mcg capsule, Take 1 capsule (250 mcg) by mouth twice a day., Disp: , Rfl:     finasteride (Proscar) 5 mg tablet, Take 1 tablet (5 mg) by mouth once daily., Disp: , Rfl:     FreeStyle Nimesh 2 [place] misc, Inject under the skin if needed. Use as instructed, Disp: , Rfl:     FreeStyle Nimesh 2 Sensor kit, Inject under the skin if needed. Use as instructed, Disp: , Rfl:     furosemide (Lasix) 20 mg tablet, Take 1 tablet (20 mg) by mouth once daily., Disp: , Rfl:     gabapentin (Neurontin) 100 mg capsule, Take 1 capsule (100 mg) by mouth 2 times a day., Disp: , Rfl:     glucosamine-D3-Boswellia serr (Glucosamine Daily Complex) 1,500-400-100 mg-unit-mg tablet, Take 2 tablets by mouth once daily., Disp: , Rfl:     insulin aspart (NovoLOG Flexpen U-100 Insulin) 100 unit/mL (3 mL) pen, Use as directed up to a total of 50 units per day, Take as directed per insulin instructions., Disp: 45 mL, Rfl: 3    insulin glargine (Basaglar KwikPen U-100 Insulin) 100 unit/mL (3 mL) pen, 23 units bid Take as directed per insulin instructions., Disp: 50 mL, Rfl: 3    ipratropium-albuteroL (Duo-Neb) 0.5-2.5 mg/3 mL nebulizer solution, Inhale 3 mL every 4 hours if needed., Disp: , Rfl:     isosorbide mononitrate ER (Imdur) 30 mg 24 hr tablet, Take 1 tablet (30 mg) by mouth twice a day., Disp: , Rfl:     losartan (Cozaar) 50 mg tablet, Take 1 tablet (50 mg) by mouth once daily., Disp: , Rfl:     OneTouch Ultra Test strip,  "OneTouch Ultra Blue STRP  Quantity: 300  Refills: 0      Start : 4-Apr-2018  Active, Disp: , Rfl:     pen needle, diabetic (BD Ultra-Fine Leydi Pen Needle) 32 gauge x 5/32\" needle, Use as directed 5 times daily, Disp: 500 each, Rfl: 3    polyethylene glycol (Miralax) 17 gram/dose powder, MiraLax POWD  Refills: 0     Active, Disp: , Rfl:     sennosides-docusate sodium (Radha-Colace) 8.6-50 mg tablet, Take 1 tablet by mouth twice a day., Disp: , Rfl:     triamcinolone (Kenalog) 0.025 % ointment, Apply 1 Application topically twice a day., Disp: , Rfl:     "

## 2025-07-28 NOTE — PROGRESS NOTES
Attestation signed by Nicolas Jiménez MD on 7/28/25 at 11:09 AM.    I, Dr Nicolas Jiménez, have reviewed this progress note, medication list, vital signs, any pertinent lab values, and any CGM data if present with the [facility] and  face to face during this visit today. This note reflects the treatment plan that was made under my direction after reviewing the above mentioned elements while face to face with the patient and CDE.  I personally answered and addressed any questions and concerns the patient had during the visit today.  The CDE entered the data in this note under my direction and I personally reviewed it, signed any lab or medication orders that I instructed to be completed. I am the billing provider for this visit and the level of service was determined by my involvement in the Medical Decision Making Component of this visit while face to face with the patient.

## 2026-01-27 ENCOUNTER — APPOINTMENT (OUTPATIENT)
Facility: CLINIC | Age: 86
End: 2026-01-27
Payer: MEDICARE

## 2026-04-09 ENCOUNTER — APPOINTMENT (OUTPATIENT)
Dept: OPHTHALMOLOGY | Facility: CLINIC | Age: 86
End: 2026-04-09
Payer: MEDICARE